# Patient Record
Sex: FEMALE
[De-identification: names, ages, dates, MRNs, and addresses within clinical notes are randomized per-mention and may not be internally consistent; named-entity substitution may affect disease eponyms.]

---

## 2022-07-06 PROBLEM — Z00.00 ENCOUNTER FOR PREVENTIVE HEALTH EXAMINATION: Status: ACTIVE | Noted: 2022-07-06

## 2022-09-14 ENCOUNTER — TRANSCRIPTION ENCOUNTER (OUTPATIENT)
Age: 32
End: 2022-09-14

## 2022-09-14 ENCOUNTER — APPOINTMENT (OUTPATIENT)
Dept: OBGYN | Facility: CLINIC | Age: 32
End: 2022-09-14

## 2022-09-14 VITALS
HEIGHT: 68 IN | BODY MASS INDEX: 29.1 KG/M2 | SYSTOLIC BLOOD PRESSURE: 118 MMHG | DIASTOLIC BLOOD PRESSURE: 68 MMHG | WEIGHT: 192 LBS

## 2022-09-14 DIAGNOSIS — Z78.9 OTHER SPECIFIED HEALTH STATUS: ICD-10-CM

## 2022-09-14 DIAGNOSIS — R73.03 PREDIABETES.: ICD-10-CM

## 2022-09-14 DIAGNOSIS — D56.9 THALASSEMIA, UNSPECIFIED: ICD-10-CM

## 2022-09-14 DIAGNOSIS — Z01.419 ENCOUNTER FOR GYNECOLOGICAL EXAMINATION (GENERAL) (ROUTINE) W/OUT ABNORMAL FINDINGS: ICD-10-CM

## 2022-09-14 DIAGNOSIS — Z83.3 FAMILY HISTORY OF DIABETES MELLITUS: ICD-10-CM

## 2022-09-14 LAB
HCG UR QL: NEGATIVE
QUALITY CONTROL: YES

## 2022-09-14 PROCEDURE — 81025 URINE PREGNANCY TEST: CPT

## 2022-09-14 PROCEDURE — 99204 OFFICE O/P NEW MOD 45 MIN: CPT

## 2022-09-14 NOTE — REASON FOR VISIT
[Consultation] : consultation for [FreeTextEntry2] : dysmenorrhea, nausea/vomiting, hx of ovarian cysts [FreeTextEntry1] : Dr López

## 2022-09-14 NOTE — HISTORY OF PRESENT ILLNESS
[Y] : Patient is sexually active [N] : Patient denies prior pregnancies [Menarche Age: ____] : age at menarche was [unfilled] [Currently Active] : currently active [Men] : men [Patient would like to be screened for STIs] : Patient would like to be screened for STIs [Patient reported PAP Smear was normal] : Patient reported PAP Smear was normal [Monogamous (Male Partner)] : is monogamous with a male partner [PapSmeardate] : unsure [LMPDate] : 8/25/22 [MensesFreq] : 23-05 [MensesLength] : 4 [MensesAmount] : normal  [PGHxTotal] : 0 [[facility] : 0 [FreeTextEntry1] : +h/o cysts, denies fibroids, abnormal pap, STI. Has not received Gardasil vaccine.

## 2022-09-14 NOTE — PHYSICAL EXAM
[Chaperone Present] : A chaperone was present in the examining room during all aspects of the physical examination [Appropriately responsive] : appropriately responsive [Alert] : alert [No Acute Distress] : no acute distress [No Lymphadenopathy] : no lymphadenopathy [Soft] : soft [Non-tender] : non-tender [Non-distended] : non-distended [No HSM] : No HSM [No Lesions] : no lesions [No Mass] : no mass [Oriented x3] : oriented x3 [Examination Of The Breasts] : a normal appearance [No Discharge] : no discharge [No Masses] : no breast masses were palpable [Labia Majora] : normal [Labia Minora] : normal [Normal] : normal [Retroversion] : retroverted [Uterine Adnexae] : normal [FreeTextEntry1] : JOSE Ponce [Tenderness] : nontender [Enlarged ___ wks] : not enlarged [FreeTextEntry4] : small amount thin white disharge

## 2022-09-14 NOTE — CONSULT LETTER
[Dear  ___] : Dear  [unfilled], [FreeTextEntry1] : I had the pleasure of evaluating your patient, ARMIDA ROLON. Please see my note enclosed for full details.\par \par Thank you for allowing me to participate in the care of this patient. If you have any questions, please do not hesitate to contact me.\par \par Sincerely,\par \par Lucero Heller MD, FACOG \par [facility]\par Obstetrics and Gynecology in [place]\[address], [facility] [address]\[place] [address]\[facility] Phone: [phone] Fax: [phone]

## 2022-09-14 NOTE — DISCUSSION/SUMMARY
[FreeTextEntry1] : 30 yo with dysmenorrhea and associated worsening nausea/occasional emesis. Hx of bilateral ovarian cysts. \par 1) Health maintenance: \par Pap \par GC/CT\par Declines sti testing today but would like at next visit\par Unsure if she has had Gardasil, will check her records. Gardasil handout provided to consider if she has not received\par \par 2) Dysmenorrhea, nausea/emesis, hx of ovarian cysts:\par Pelvic ultrasound for further evaluation\par NSAIDs discussed\par Pain management briefly reviewed including exercise, meditation, heating pad, dietary changes, NSAID, hormonal options. Will discuss further after ultrasound. Will also review antiemetics. She prefers not to take medications. \par

## 2022-09-15 ENCOUNTER — NON-APPOINTMENT (OUTPATIENT)
Age: 32
End: 2022-09-15

## 2022-09-15 LAB
C TRACH RRNA SPEC QL NAA+PROBE: NOT DETECTED
N GONORRHOEA RRNA SPEC QL NAA+PROBE: NOT DETECTED
SOURCE TP AMPLIFICATION: NORMAL

## 2022-09-19 ENCOUNTER — APPOINTMENT (OUTPATIENT)
Dept: OBGYN | Facility: CLINIC | Age: 32
End: 2022-09-19

## 2022-09-19 DIAGNOSIS — N83.209 UNSPECIFIED OVARIAN CYST, UNSPECIFIED SIDE: ICD-10-CM

## 2022-09-19 PROCEDURE — 99213 OFFICE O/P EST LOW 20 MIN: CPT | Mod: 95

## 2022-09-19 NOTE — DISCUSSION/SUMMARY
[FreeTextEntry1] : 31-year-old with dysmenorrhea and associated worsening nausea and occasional vomiting.\par – Options reviewed for management of dysmenorrhea, patient will try heating pad\par – Plan to start antiemetic Zofran 4 mg disintegrating tablet, no contraindications, prescription sent to pharmacy\par –Right ovarian cyst likely follicular cyst and does not require follow-up at this time\par – Monitor symptoms over next 3 months and return for follow-up

## 2022-09-19 NOTE — HISTORY OF PRESENT ILLNESS
[FreeTextEntry1] : Pt here to discuss results. \par \par Pelvic ultrasound: Uterus anteverted 6.2 x 4.7 cm endometrium 0.7 cm in thickness, right ovary 4.8 x 3.4 x 2.5 cm, right ovarian 1.5 cm cyst, left ovary not visualized, no free fluid.\par \par Images were reviewed myself and the results were discussed with the patient.  Discussed options for management of dysmenorrhea including exercise, meditation, heating pad, NSAIDs, hormonal options such as oral contraceptives, contraceptive patch, contraceptive ring, Depo, progesterone IUD.  Declines medication at this time, will try heating pad.  We also discussed the possibility of antiemetics to see if her nausea will improve.  She is amenable to trying this

## 2022-09-19 NOTE — CONSULT LETTER
[Dear  ___] : Dear  [unfilled], [Consult Letter:] : I had the pleasure of evaluating your patient, [unfilled]. [FreeTextEntry1] : I had the pleasure of evaluating your patient, ARMIDA ROLON, for follow-up. Please see my note enclosed for full details.\par \par Thank you for allowing me to participate in the care of this patient. If you have any questions, please do not hesitate to contact me.\par \par Sincerely,\par \par Lucero Heller MD, FACOG \par [facility]\par Obstetrics and Gynecology in [place]\[address], [address]\[place] [address]\[facility] Phone: [phone] Fax: [phone]

## 2022-09-19 NOTE — REASON FOR VISIT
[Home] : at home, [unfilled] , at the time of the visit. [Medical Office: ([facility])___] : at the medical office located in  [Patient] : the patient [Self] : self [Follow-Up] : a follow-up evaluation of

## 2022-09-22 LAB — CYTOLOGY CVX/VAG DOC THIN PREP: ABNORMAL

## 2022-11-18 ENCOUNTER — NON-APPOINTMENT (OUTPATIENT)
Age: 32
End: 2022-11-18

## 2022-12-07 ENCOUNTER — OFFICE (OUTPATIENT)
Dept: URBAN - METROPOLITAN AREA CLINIC 103 | Facility: CLINIC | Age: 32
Setting detail: OPHTHALMOLOGY
End: 2022-12-07
Payer: COMMERCIAL

## 2022-12-07 DIAGNOSIS — H40.003: ICD-10-CM

## 2022-12-07 PROCEDURE — 92133 CPTRZD OPH DX IMG PST SGM ON: CPT | Performed by: OPHTHALMOLOGY

## 2022-12-07 PROCEDURE — 92012 INTRM OPH EXAM EST PATIENT: CPT | Performed by: OPHTHALMOLOGY

## 2022-12-07 ASSESSMENT — REFRACTION_CURRENTRX
OD_SPHERE: -1.75
OS_SPHERE: -2.00
OD_VPRISM_DIRECTION: SV
OD_CYLINDER: -1.75
OD_OVR_VA: 20/
OD_AXIS: 163
OS_OVR_VA: 20/
OS_AXIS: 167
OS_VPRISM_DIRECTION: SV
OS_CYLINDER: -0.50

## 2022-12-07 ASSESSMENT — REFRACTION_MANIFEST
OD_AXIS: 164
OD_CYLINDER: -1.50
OS_SPHERE: -2.00
OD_SPHERE: -1.50
OD_VA1: 20/20
OS_CYLINDER: -1.00
OS_AXIS: 178
OS_VA1: 20/25

## 2022-12-07 ASSESSMENT — REFRACTION_AUTOREFRACTION
OS_CYLINDER: -1.25
OS_AXIS: 179
OS_SPHERE: -1.75
OD_SPHERE: -1.50
OD_AXIS: 166
OD_CYLINDER: -1.75

## 2022-12-07 ASSESSMENT — VISUAL ACUITY
OS_BCVA: 20/20
OD_BCVA: 20/20-2

## 2022-12-07 ASSESSMENT — PACHYMETRY
OS_CT_CORRECTION: 4
OD_CT_UM: 502
OD_CT_CORRECTION: 3
OS_CT_UM: 497

## 2022-12-07 ASSESSMENT — KERATOMETRY
OS_AXISANGLE_DEGREES: 094
OD_K1POWER_DIOPTERS: 44.00
OS_K1POWER_DIOPTERS: 43.25
OD_K2POWER_DIOPTERS: 46.00
OS_K2POWER_DIOPTERS: 45.00
OD_AXISANGLE_DEGREES: 077

## 2022-12-07 ASSESSMENT — CONFRONTATIONAL VISUAL FIELD TEST (CVF)
OS_FINDINGS: FULL
OD_FINDINGS: FULL

## 2022-12-07 ASSESSMENT — AXIALLENGTH_DERIVED
OD_AL: 23.9723
OS_AL: 24.3088
OD_AL: 23.9221
OS_AL: 24.3606

## 2022-12-07 ASSESSMENT — TONOMETRY
OS_IOP_MMHG: 12
OD_IOP_MMHG: 12

## 2022-12-07 ASSESSMENT — SPHEQUIV_DERIVED
OS_SPHEQUIV: -2.375
OS_SPHEQUIV: -2.5
OD_SPHEQUIV: -2.375
OD_SPHEQUIV: -2.25

## 2022-12-15 ENCOUNTER — APPOINTMENT (OUTPATIENT)
Dept: OBGYN | Facility: CLINIC | Age: 32
End: 2022-12-15

## 2022-12-15 VITALS
BODY MASS INDEX: 30.16 KG/M2 | DIASTOLIC BLOOD PRESSURE: 64 MMHG | WEIGHT: 199 LBS | SYSTOLIC BLOOD PRESSURE: 100 MMHG | HEIGHT: 68 IN

## 2022-12-15 PROCEDURE — 99213 OFFICE O/P EST LOW 20 MIN: CPT

## 2022-12-15 RX ORDER — ONDANSETRON 4 MG/1
4 TABLET, ORALLY DISINTEGRATING ORAL
Qty: 30 | Refills: 2 | Status: ACTIVE | COMMUNITY
Start: 2022-09-19 | End: 1900-01-01

## 2022-12-15 NOTE — HISTORY OF PRESENT ILLNESS
[FreeTextEntry1] : Pt here for follow-up. States last 2 menses she has not had significant nausea or pain. Last menses in October she did have one episode of emesis after eating. Has not required Zofran that she was prescribed. DId not require pain medication. Did not require heating pad. \par \par SHe did end her relationship with her partner and June which she felt was causing her stress. SHe also recently changed her diet as she is prediabetic, stopped drinking juices and sugar drinks, drinking more water. We discussed dietary changes that can help with dysmenorrhea, also discussed incorporating exercise. \par \par SHe would like to continue monitoring her menstrual symptoms at this time as she has had improvement over the last 2 cycles. SHe never picked up Zofran, requests another Rx be sent.

## 2023-07-05 ENCOUNTER — APPOINTMENT (OUTPATIENT)
Dept: OBGYN | Facility: CLINIC | Age: 33
End: 2023-07-05

## 2023-09-13 ENCOUNTER — APPOINTMENT (OUTPATIENT)
Dept: OBGYN | Facility: CLINIC | Age: 33
End: 2023-09-13
Payer: COMMERCIAL

## 2023-09-13 DIAGNOSIS — N94.6 DYSMENORRHEA, UNSPECIFIED: ICD-10-CM

## 2023-09-13 DIAGNOSIS — R11.2 NAUSEA WITH VOMITING, UNSPECIFIED: ICD-10-CM

## 2023-09-13 PROCEDURE — 99213 OFFICE O/P EST LOW 20 MIN: CPT | Mod: 95

## 2023-09-13 RX ORDER — METOCLOPRAMIDE 10 MG/1
10 TABLET ORAL EVERY 6 HOURS
Qty: 30 | Refills: 6 | Status: ACTIVE | COMMUNITY
Start: 2023-09-13 | End: 1900-01-01